# Patient Record
Sex: MALE | Race: BLACK OR AFRICAN AMERICAN | Employment: FULL TIME | URBAN - METROPOLITAN AREA
[De-identification: names, ages, dates, MRNs, and addresses within clinical notes are randomized per-mention and may not be internally consistent; named-entity substitution may affect disease eponyms.]

---
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
## 2021-10-19 ENCOUNTER — HOSPITAL ENCOUNTER (EMERGENCY)
Age: 21
Discharge: HOME | End: 2021-10-19
Attending: STUDENT IN AN ORGANIZED HEALTH CARE EDUCATION/TRAINING PROGRAM
Payer: MEDICAID

## 2021-10-19 VITALS
SYSTOLIC BLOOD PRESSURE: 130 MMHG | BODY MASS INDEX: 20.51 KG/M2 | TEMPERATURE: 98.6 F | HEIGHT: 75 IN | OXYGEN SATURATION: 100 % | HEART RATE: 66 BPM | DIASTOLIC BLOOD PRESSURE: 78 MMHG | RESPIRATION RATE: 16 BRPM | WEIGHT: 165 LBS

## 2021-10-19 DIAGNOSIS — S61.215A LACERATION OF LEFT RING FINGER WITHOUT FOREIGN BODY WITHOUT DAMAGE TO NAIL, INITIAL ENCOUNTER: Primary | ICD-10-CM

## 2021-10-19 PROCEDURE — 75810000293 HC SIMP/SUPERF WND  RPR

## 2021-10-19 PROCEDURE — 99281 EMR DPT VST MAYX REQ PHY/QHP: CPT

## 2021-10-19 NOTE — ED PROVIDER NOTES
EMERGENCY DEPARTMENT HISTORY AND PHYSICAL EXAM    5:33 PM      Date: 10/19/2021  Patient Name: Osiris White    History of Presenting Illness     Chief Complaint   Patient presents with    Hand Injury         History Provided By: Patient    Additional History (Context): Osiris White is a 24 y.o. male with past medical history significant for asthma who presents with complaints of a laceration to the left ring finger that he sustained while at work. States he had a  in his right hand and was opening a door and accidentally sliced over his knuckle on the left hand. He is ambidextrous. Denies paresthesia. Not on any blood thinners. PCP: Donald Bautista MD        Past History     Past Medical History:  Past Medical History:   Diagnosis Date    Asthma        Past Surgical History:  History reviewed. No pertinent surgical history. Family History:  History reviewed. No pertinent family history. Social History:  Social History     Tobacco Use    Smoking status: Never Smoker    Smokeless tobacco: Never Used   Substance Use Topics    Alcohol use: No    Drug use: Never       Allergies:  No Known Allergies      Review of Systems       Review of Systems   Constitutional: Negative. Negative for chills and fever. HENT: Negative. Negative for congestion, ear pain and rhinorrhea. Eyes: Negative. Negative for pain and redness. Respiratory: Negative. Negative for cough and shortness of breath. Cardiovascular: Negative. Negative for chest pain, palpitations and leg swelling. Gastrointestinal: Negative. Negative for abdominal pain, constipation, diarrhea, nausea and vomiting. Genitourinary: Negative. Negative for dysuria, frequency, hematuria and urgency. Musculoskeletal: Negative. Negative for back pain, gait problem, joint swelling and neck pain. Skin: Positive for wound (Laceration left ring finger middle phalanx). Negative for rash. Neurological: Negative. Negative for dizziness, seizures, speech difficulty, weakness, light-headedness and headaches. Hematological: Negative for adenopathy. Does not bruise/bleed easily. All other systems reviewed and are negative. Physical Exam     Visit Vitals  /78 (BP 1 Location: Left upper arm, BP Patient Position: At rest)   Pulse 66   Temp 98.6 °F (37 °C)   Resp 16   Ht 6' 3\" (1.905 m)   Wt 74.8 kg (165 lb)   SpO2 100%   BMI 20.62 kg/m²         Physical Exam  Vitals and nursing note reviewed. Constitutional:       General: He is not in acute distress. Appearance: Normal appearance. He is normal weight. He is not ill-appearing, toxic-appearing or diaphoretic. HENT:      Head: Normocephalic and atraumatic. Eyes:      General: Vision grossly intact. Gaze aligned appropriately. Right eye: No discharge. Left eye: No discharge. Conjunctiva/sclera: Conjunctivae normal.      Pupils: Pupils are equal, round, and reactive to light. Cardiovascular:      Rate and Rhythm: Normal rate and regular rhythm. Pulmonary:      Effort: Pulmonary effort is normal. No respiratory distress. Breath sounds: Normal breath sounds. Abdominal:      General: Abdomen is flat. Palpations: Abdomen is soft. Tenderness: There is no abdominal tenderness. Musculoskeletal:         General: Normal range of motion. Left hand: Laceration present. No swelling, deformity, tenderness or bony tenderness. Normal range of motion. Normal strength. Normal sensation. Normal capillary refill. Normal pulse. Hands:       Cervical back: Full passive range of motion without pain, normal range of motion and neck supple. Comments: No reproducible bony tenderness of the left ring finger.   Normal thumb extension, A-OK sign and cross finger abduction and finger abduction normal and intact.  Opposition is normal.  Normal digital sensation along the median, ulnar and radial distributions.  Normal capillary refill. Skin:     General: Skin is warm and dry. Capillary Refill: Capillary refill takes less than 2 seconds. Neurological:      General: No focal deficit present. Mental Status: He is alert and oriented to person, place, and time. Diagnostic Study Results     Labs -  No results found for this or any previous visit (from the past 12 hour(s)). Radiologic Studies -   No orders to display         Medical Decision Making   I am the first provider for this patient. I reviewed available nursing notes, past medical history, past surgical history, family history and social history. Vital Signs-Reviewed the patient's vital signs. Records Reviewed: Nursing Notes and Old Medical Records (Time of Review: 5:33 PM)    Pulse Oximetry Analysis - 100% on RA- normal     Wound Closure by Adhesive    Date/Time: 10/19/2021 5:39 PM  Performed by: JEANETTE Griffith  Authorized by: JEANETTE Griffith     Consent:     Consent obtained:  Verbal    Consent given by:  Patient    Risks discussed:  Infection, need for additional repair and nerve damage    Alternatives discussed:  No treatment  Anesthesia (see MAR for exact dosages): Anesthesia method:  None  Laceration details:     Location:  Hand        ED Course: Progress Notes, Reevaluation, and Consults:  5:33 PM  Initial assessment performed. The patients presenting problems have been discussed, and they/their family are in agreement with the care plan formulated and outlined with them. I have encouraged them to ask questions as they arise throughout their visit. Provider Notes (Medical Decision Making):     Patient is a 24-year-old male who presents to the ER with a laceration to the middle phalanx on the dorsal aspect of the left hand. He did this with a  at work. His tetanus shot is up-to-date. No signs of infection. Wound repaired with Steri-Strips and he tolerated this well.   Very superficial laceration not over an area of flexion. Given both verbal and written wound care instructions patient verbalized understanding. Discharge home with follow-up the PCP and ER return precautions discussed. Diagnosis     Clinical Impression:   1. Laceration of left ring finger without foreign body without damage to nail, initial encounter        Disposition: Discharged home in stable condition    DISCHARGE NOTE:     Patient has been reexamined. Patient has no new complaints, changes, or physical findings. Care plan outlined and precautions discussed. Results of physical exam findings were reviewed with the patient. All of patient's questions and concerns were addressed. Patient was instructed and agrees to follow up with PCP, as well as to return to the ED upon further deterioration. Patient is ready to go home. Follow-up Information     Follow up With Specialties Details Why Contact Info    Earlene Saleh MD Pediatric Medicine Schedule an appointment as soon as possible for a visit  Follow-up from the Emergency Department [address] [place] [address],[address] Floor  [place] [id number] [phone] [address] [facility] [place] EMERGENCY DEPT Emergency Medicine  As needed, If symptoms worsen [address] [facility]  [phone]           There are no discharge medications for this patient. Dictation disclaimer:  Please note that this dictation was completed with [employer], the [employer] voice recognition software. Quite often unanticipated grammatical, syntax, homophones, and other interpretive errors are inadvertently transcribed by the computer software. Please disregard these errors. Please excuse any errors that have escaped final proofreading.

## 2021-10-19 NOTE — ED TRIAGE NOTES
Patient states \"peeling the skin back\" on his left 4th finger with  while opening a door today. Patient arrives with bandage in place to affected area.

## 2024-02-03 ENCOUNTER — HOSPITAL ENCOUNTER (EMERGENCY)
Facility: HOSPITAL | Age: 24
Discharge: HOME | End: 2024-02-03
Attending: EMERGENCY MEDICINE

## 2024-02-03 VITALS
HEIGHT: 75 IN | OXYGEN SATURATION: 98 % | BODY MASS INDEX: 20.51 KG/M2 | DIASTOLIC BLOOD PRESSURE: 68 MMHG | SYSTOLIC BLOOD PRESSURE: 131 MMHG | TEMPERATURE: 97.5 F | RESPIRATION RATE: 16 BRPM | HEART RATE: 63 BPM | WEIGHT: 165 LBS

## 2024-02-03 DIAGNOSIS — H10.9 CONJUNCTIVITIS OF LEFT EYE, UNSPECIFIED CONJUNCTIVITIS TYPE: Primary | ICD-10-CM

## 2024-02-03 DIAGNOSIS — J02.9 ACUTE PHARYNGITIS, UNSPECIFIED ETIOLOGY: ICD-10-CM

## 2024-02-03 LAB — DEPRECATED S PYO AG THROAT QL EIA: NEGATIVE

## 2024-02-03 PROCEDURE — 87070 CULTURE OTHR SPECIMN AEROBIC: CPT

## 2024-02-03 PROCEDURE — 99283 EMERGENCY DEPT VISIT LOW MDM: CPT

## 2024-02-03 PROCEDURE — 87880 STREP A ASSAY W/OPTIC: CPT

## 2024-02-03 RX ORDER — CIPROFLOXACIN HYDROCHLORIDE 3.5 MG/ML
1 SOLUTION/ DROPS TOPICAL EVERY 8 HOURS
Qty: 2.5 ML | Refills: 0 | Status: SHIPPED | OUTPATIENT
Start: 2024-02-03 | End: 2024-02-10

## 2024-02-03 RX ORDER — IBUPROFEN 600 MG/1
600 TABLET ORAL EVERY 6 HOURS PRN
Qty: 20 TABLET | Refills: 0 | Status: SHIPPED | OUTPATIENT
Start: 2024-02-03

## 2024-02-03 ASSESSMENT — VISUAL ACUITY
OU: 20/20
OD: 20/20
OS: 20/20

## 2024-02-03 ASSESSMENT — PAIN - FUNCTIONAL ASSESSMENT: PAIN_FUNCTIONAL_ASSESSMENT: 0-10

## 2024-02-03 ASSESSMENT — ENCOUNTER SYMPTOMS
ABDOMINAL PAIN: 0
CHEST TIGHTNESS: 0
SORE THROAT: 1
EYE REDNESS: 1
TROUBLE SWALLOWING: 1

## 2024-02-03 ASSESSMENT — PAIN SCALES - GENERAL: PAINLEVEL_OUTOF10: 5

## 2024-02-03 NOTE — ED TRIAGE NOTES
Ambulatory to  with c/o redness, crusty drainage to left eye x 2 days, brother with conjunctivitis.

## 2024-02-04 NOTE — ED PROVIDER NOTES
EMERGENCY DEPARTMENT HISTORY AND PHYSICAL EXAM    7:26 PM      Date: 2/3/2024  Patient Name: Deepak Brand    History of Presenting Illness     Chief Complaint   Patient presents with    Eye Problem       History From: Patient  Patient is 23-year-old male with history of asthma that presents emergency department with 2 days of left eye redness.  The patient's brother had conjunctivitis and said he started having symptoms a couple days ago.  In addition has had some sore throat and mild cough.  Patient said he just been taking his medication for and it has been helping.  Patient is worried about his sore throat and wants to make sure he does not have another infection.  Patient works at [employer] and has not missed any work.  Patient is not a smoker, does not drink any alcohol, denies any drug use.  Patient denies any other aggravating or alleviating factors.              Nursing Notes were all reviewed and agreed with or any disagreements were addressed in the HPI.    PCP: Will Horner MD    No current facility-administered medications for this encounter.     Current Outpatient Medications   Medication Sig Dispense Refill    ciprofloxacin (CILOXAN) 0.3 % ophthalmic solution Place 1 drop into the left eye every 8 (eight) hours for 7 days 2.5 mL 0    ibuprofen (IBU) 600 MG tablet Take 1 tablet by mouth every 6 hours as needed for Pain 20 tablet 0       Past History     Past Medical History:  Past Medical History:   Diagnosis Date    Asthma        Past Surgical History:  No past surgical history on file.    Family History:  No family history on file.    Social History:  Social History     Tobacco Use    Smoking status: Never    Smokeless tobacco: Never   Substance Use Topics    Alcohol use: No    Drug use: Never       Allergies:  No Known Allergies      Review of Systems       Review of Systems   Constitutional:  Negative for activity change and fatigue.   HENT:  Positive for congestion, sore throat and

## 2024-02-06 LAB
BACTERIA SPEC CULT: NORMAL
SERVICE CMNT-IMP: NORMAL

## 2024-10-19 ENCOUNTER — HOSPITAL ENCOUNTER (EMERGENCY)
Facility: HOSPITAL | Age: 24
Discharge: HOME | End: 2024-10-19
Payer: MEDICAID

## 2024-10-19 VITALS
HEART RATE: 82 BPM | OXYGEN SATURATION: 98 % | WEIGHT: 178 LBS | RESPIRATION RATE: 18 BRPM | SYSTOLIC BLOOD PRESSURE: 143 MMHG | TEMPERATURE: 98.7 F | HEIGHT: 75 IN | DIASTOLIC BLOOD PRESSURE: 83 MMHG | BODY MASS INDEX: 22.13 KG/M2

## 2024-10-19 DIAGNOSIS — S09.90XA CLOSED HEAD INJURY, INITIAL ENCOUNTER: ICD-10-CM

## 2024-10-19 DIAGNOSIS — S00.03XA HEMATOMA OF FRONTAL SCALP, INITIAL ENCOUNTER: ICD-10-CM
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
## 2024-10-19 DIAGNOSIS — T14.8XXA ABRASION: ICD-10-CM

## 2024-10-19 PROCEDURE — 90714 TD VACC NO PRESV 7 YRS+ IM: CPT | Performed by: EMERGENCY MEDICINE

## 2024-10-19 PROCEDURE — 99284 EMERGENCY DEPT VISIT MOD MDM: CPT

## 2024-10-19 PROCEDURE — 90471 IMMUNIZATION ADMIN: CPT | Performed by: EMERGENCY MEDICINE

## 2024-10-19 PROCEDURE — 6360000002 HC RX W HCPCS: Performed by: EMERGENCY MEDICINE

## 2024-10-19 RX ORDER — ACETAMINOPHEN 500 MG
1000 TABLET ORAL 4 TIMES DAILY PRN
Qty: 30 TABLET | Refills: 1 | Status: SHIPPED | OUTPATIENT
Start: 2024-10-19

## 2024-10-19 RX ORDER — METHOCARBAMOL 750 MG/1
750 TABLET, FILM COATED ORAL 4 TIMES DAILY
Qty: 20 TABLET | Refills: 0 | Status: SHIPPED | OUTPATIENT
Start: 2024-10-19 | End: 2024-10-24

## 2024-10-19 RX ADMIN — CLOSTRIDIUM TETANI TOXOID ANTIGEN (FORMALDEHYDE INACTIVATED) AND CORYNEBACTERIUM DIPHTHERIAE TOXOID ANTIGEN (FORMALDEHYDE INACTIVATED) 0.5 ML: 5; 2 INJECTION, SUSPENSION INTRAMUSCULAR at 15:52

## 2024-10-19 ASSESSMENT — ENCOUNTER SYMPTOMS
BACK PAIN: 0
EYES NEGATIVE: 1
RECTAL PAIN: 0
NAUSEA: 0
RESPIRATORY NEGATIVE: 1
ALLERGIC/IMMUNOLOGIC NEGATIVE: 1
GASTROINTESTINAL NEGATIVE: 1

## 2024-10-19 ASSESSMENT — PAIN - FUNCTIONAL ASSESSMENT: PAIN_FUNCTIONAL_ASSESSMENT: NONE - DENIES PAIN
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
## 2024-12-10 SDOH — HEALTH STABILITY: PHYSICAL HEALTH: ON AVERAGE, HOW MANY DAYS PER WEEK DO YOU ENGAGE IN MODERATE TO STRENUOUS EXERCISE (LIKE A BRISK WALK)?: 5 DAYS

## 2024-12-10 SDOH — HEALTH STABILITY: PHYSICAL HEALTH: ON AVERAGE, HOW MANY MINUTES DO YOU ENGAGE IN EXERCISE AT THIS LEVEL?: 60 MIN

## 2024-12-11 ENCOUNTER — HOSPITAL ENCOUNTER (OUTPATIENT)
Facility: HOSPITAL | Age: 24
Setting detail: SPECIMEN
Discharge: HOME | End: 2024-12-14

## 2024-12-11 ENCOUNTER — OFFICE VISIT (OUTPATIENT)
Facility: CLINIC | Age: 24
End: 2024-12-11

## 2024-12-11 VITALS
SYSTOLIC BLOOD PRESSURE: 136 MMHG | WEIGHT: 180 LBS | HEIGHT: 75 IN | BODY MASS INDEX: 22.38 KG/M2 | DIASTOLIC BLOOD PRESSURE: 83 MMHG | RESPIRATION RATE: 18 BRPM | HEART RATE: 58 BPM | OXYGEN SATURATION: 97 %

## 2024-12-11 DIAGNOSIS — Z72.51 UNPROTECTED SEX: Primary | ICD-10-CM

## 2024-12-11 DIAGNOSIS — Z11.3 SCREEN FOR STD (SEXUALLY TRANSMITTED DISEASE): ICD-10-CM

## 2024-12-11 LAB — LABCORP SPECIMEN COLLECTION: NORMAL

## 2024-12-11 PROCEDURE — 99001 SPECIMEN HANDLING PT-LAB: CPT

## 2024-12-11 SDOH — ECONOMIC STABILITY: FOOD INSECURITY: WITHIN THE PAST 12 MONTHS, YOU WORRIED THAT YOUR FOOD WOULD RUN OUT BEFORE YOU GOT MONEY TO BUY MORE.: NEVER TRUE

## 2024-12-11 SDOH — ECONOMIC STABILITY: FOOD INSECURITY: WITHIN THE PAST 12 MONTHS, THE FOOD YOU BOUGHT JUST DIDN'T LAST AND YOU DIDN'T HAVE MONEY TO GET MORE.: NEVER TRUE

## 2024-12-11 SDOH — ECONOMIC STABILITY: INCOME INSECURITY: HOW HARD IS IT FOR YOU TO PAY FOR THE VERY BASICS LIKE FOOD, HOUSING, MEDICAL CARE, AND HEATING?: NOT HARD AT ALL

## 2024-12-11 ASSESSMENT — PATIENT HEALTH QUESTIONNAIRE - PHQ9
SUM OF ALL RESPONSES TO PHQ QUESTIONS 1-9: 0
2. FEELING DOWN, DEPRESSED OR HOPELESS: NOT AT ALL
1. LITTLE INTEREST OR PLEASURE IN DOING THINGS: NOT AT ALL
SUM OF ALL RESPONSES TO PHQ9 QUESTIONS 1 & 2: 0
SUM OF ALL RESPONSES TO PHQ QUESTIONS 1-9: 0

## 2024-12-11 NOTE — PROGRESS NOTES
Subjective:  Deepak is a 24 y.o. y.o. male who presents for following.       Pt states having unprotected sex  Patient would like STD screen    ROS:   General: no fever  CV:  no chest pain  Resp: no shortness of breath  AB: no  abdominal pain     Past Medical History:   Diagnosis Date    Asthma       Past Surgical History:   Procedure Laterality Date    NO PAST SURGERIES        Social History     Socioeconomic History    Marital status: Single     Spouse name: None    Number of children: None    Years of education: None    Highest education level: None   Tobacco Use    Smoking status: Never    Smokeless tobacco: Never   Substance and Sexual Activity    Alcohol use: No    Drug use: Not Currently     Types: Marijuana (Weed)     Social Determinants of Health     Financial Resource Strain: Low Risk  (12/11/2024)    Overall Financial Resource Strain (CARDIA)     Difficulty of Paying Living Expenses: Not hard at all   Food Insecurity: No Food Insecurity (12/11/2024)    Hunger Vital Sign     Worried About Running Out of Food in the Last Year: Never true     Ran Out of Food in the Last Year: Never true   Transportation Needs: Unknown (12/11/2024)    PRAPARE - Transportation     Lack of Transportation (Non-Medical): No   Physical Activity: Sufficiently Active (12/10/2024)    Exercise Vital Sign     Days of Exercise per Week: 5 days     Minutes of Exercise per Session: 60 min   Housing Stability: Unknown (12/11/2024)    Housing Stability Vital Sign     Homeless in the Last Year: No      Current Outpatient Medications   Medication Sig Dispense Refill    acetaminophen (TYLENOL) 500 MG tablet Take 2 tablets by mouth 4 times daily as needed for Pain 30 tablet 1    ibuprofen (IBU) 600 MG tablet Take 1 tablet by mouth every 6 hours as needed for Pain 20 tablet 0     No current facility-administered medications for this visit.      No Known Allergies   No family history on file.         OBJECTIVE  Vitals:    12/11/24 1051

## 2024-12-12 LAB
HIV 1+2 AB+HIV1 P24 AG SERPL QL IA: NON REACTIVE
RPR SER QL: NON REACTIVE

## 2024-12-14 LAB
C TRACH RRNA SPEC QL NAA+PROBE: NEGATIVE
N GONORRHOEA RRNA SPEC QL NAA+PROBE: NEGATIVE
T VAGINALIS RRNA SPEC QL NAA+PROBE: NEGATIVE